# Patient Record
(demographics unavailable — no encounter records)

---

## 2024-06-10 DIAGNOSIS — Z30.011 ENCOUNTER FOR INITIAL PRESCRIPTION OF CONTRACEPTIVE PILLS: Primary | ICD-10-CM

## 2024-06-10 NOTE — TELEPHONE ENCOUNTER
Called patient regarding refill request, patient has not been seen since   Identified by name and   Patient states she is at work and is unable to talk but can call back tomorrow.    QUYEN RichardsN RN

## 2024-06-11 NOTE — TELEPHONE ENCOUNTER
Called patient regarding refill request  Left vm for patient to return call.    [facility], QUYENN RN

## 2024-06-17 RX ORDER — DESOGESTREL AND ETHINYL ESTRADIOL 0.15-0.03
KIT ORAL
Qty: 84 TABLET | Refills: 3 | Status: SHIPPED | OUTPATIENT
Start: 2024-06-17 | End: 2025-06-17